# Patient Record
Sex: MALE | Race: WHITE | NOT HISPANIC OR LATINO | URBAN - NONMETROPOLITAN AREA
[De-identification: names, ages, dates, MRNs, and addresses within clinical notes are randomized per-mention and may not be internally consistent; named-entity substitution may affect disease eponyms.]

---

## 2020-09-23 ENCOUNTER — TRANSCRIBE ORDERS (OUTPATIENT)
Dept: LAB | Facility: HOSPITAL | Age: 55
End: 2020-09-23

## 2020-09-23 ENCOUNTER — LAB (OUTPATIENT)
Dept: LAB | Facility: HOSPITAL | Age: 55
End: 2020-09-23

## 2020-09-23 DIAGNOSIS — D89.89 RADIATION CHIMERA (HCC): ICD-10-CM

## 2020-09-23 DIAGNOSIS — M13.0 POLYARTHROPATHY: ICD-10-CM

## 2020-09-23 DIAGNOSIS — R76.8 FALSE POSITIVE SEROLOGICAL TEST FOR SYPHILIS: ICD-10-CM

## 2020-09-23 DIAGNOSIS — R11.0 NAUSEA: ICD-10-CM

## 2020-09-23 DIAGNOSIS — M72.0 CONTRACTURE OF PALMAR FASCIA: ICD-10-CM

## 2020-09-23 DIAGNOSIS — M13.0 POLYARTHROPATHY: Primary | ICD-10-CM

## 2020-09-23 LAB
CK SERPL-CCNC: 59 U/L (ref 20–200)
CRP SERPL-MCNC: 0.53 MG/DL (ref 0–0.5)
ERYTHROCYTE [SEDIMENTATION RATE] IN BLOOD: 30 MM/HR (ref 0–20)
HAV IGM SERPL QL IA: NORMAL
HBV CORE IGM SERPL QL IA: NORMAL
HBV SURFACE AG SERPL QL IA: NORMAL
HCV AB SER DONR QL: NORMAL
HOLD SPECIMEN: NORMAL
URATE SERPL-MCNC: 6.2 MG/DL (ref 3.4–7)

## 2020-09-23 PROCEDURE — 36415 COLL VENOUS BLD VENIPUNCTURE: CPT

## 2020-09-23 PROCEDURE — 86140 C-REACTIVE PROTEIN: CPT

## 2020-09-23 PROCEDURE — 82550 ASSAY OF CK (CPK): CPT

## 2020-09-23 PROCEDURE — 84550 ASSAY OF BLOOD/URIC ACID: CPT

## 2020-09-23 PROCEDURE — 80074 ACUTE HEPATITIS PANEL: CPT

## 2020-09-23 PROCEDURE — 85652 RBC SED RATE AUTOMATED: CPT

## 2020-10-23 ENCOUNTER — OFFICE VISIT (OUTPATIENT)
Dept: PULMONOLOGY | Facility: CLINIC | Age: 55
End: 2020-10-23

## 2020-10-23 VITALS
DIASTOLIC BLOOD PRESSURE: 82 MMHG | WEIGHT: 254 LBS | RESPIRATION RATE: 18 BRPM | TEMPERATURE: 97.5 F | BODY MASS INDEX: 31.58 KG/M2 | OXYGEN SATURATION: 98 % | SYSTOLIC BLOOD PRESSURE: 132 MMHG | HEART RATE: 98 BPM | HEIGHT: 75 IN

## 2020-10-23 DIAGNOSIS — J30.1 SEASONAL ALLERGIC RHINITIS DUE TO POLLEN: ICD-10-CM

## 2020-10-23 DIAGNOSIS — R05.9 COUGH: ICD-10-CM

## 2020-10-23 DIAGNOSIS — Z87.891 PERSONAL HISTORY OF NICOTINE DEPENDENCE: ICD-10-CM

## 2020-10-23 DIAGNOSIS — R91.1 LUNG NODULE SEEN ON IMAGING STUDY: Primary | ICD-10-CM

## 2020-10-23 PROCEDURE — 99204 OFFICE O/P NEW MOD 45 MIN: CPT | Performed by: INTERNAL MEDICINE

## 2020-10-23 RX ORDER — MONTELUKAST SODIUM 10 MG/1
10 TABLET ORAL NIGHTLY
COMMUNITY

## 2020-10-23 RX ORDER — LISINOPRIL 40 MG/1
40 TABLET ORAL DAILY
COMMUNITY

## 2020-10-23 RX ORDER — PANTOPRAZOLE SODIUM 40 MG/1
40 TABLET, DELAYED RELEASE ORAL DAILY
COMMUNITY

## 2020-10-23 RX ORDER — IBUPROFEN 800 MG/1
800 TABLET ORAL EVERY 6 HOURS PRN
COMMUNITY

## 2020-10-23 RX ORDER — CHLORTHALIDONE 25 MG/1
25 TABLET ORAL DAILY
COMMUNITY

## 2020-10-23 RX ORDER — SIMVASTATIN 20 MG
20 TABLET ORAL NIGHTLY
COMMUNITY

## 2020-10-23 NOTE — PROGRESS NOTES
"     New Pulmonary Patient Office Visit      Patient Name: Festus Dinh    Referring Physician: Darby Kimbrough APRN    Chief Complaint:    Chief Complaint   Patient presents with   • Advice Only       History of Present Illness: Festus Dinh is a 55 y.o. male who is here today to establish care with Pulmonary.  Patient was referred secondary to recent CT scan showed \"2 small nodules\".  Patient did not bring scan with him to clinic today nor do I have a report.    He continues to smoke and has 50-pack-year smoking history.  His wife also smokes and they are contemplating quitting.  However, they bother cigarettes and bulk and just went out of state to buy $1000 worth of cigarettes and states he does not want to try to quit until he smokes those.    Patient is retired and has noticed that he has lived a much more sedentary lifestyle since quitting working and has gained a few pounds.  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    He states that he has never been told that he had asthma or COPD.  He denies any significant shortness of breath or cough.  Denies any hemoptysis, night sweats or unexplained weight loss.    He does have seasonal allergies which are well controlled with Singulair which was recently started.  He has known GERD and it is well controlled with Protonix.    Subjective      Review of Systems:   Review of Systems   Constitutional: Negative for appetite change and fever.   HENT: Positive for postnasal drip. Negative for nosebleeds, sore throat and voice change.    Eyes: Negative for discharge and visual disturbance.   Respiratory: Negative for cough, shortness of breath and wheezing.    Cardiovascular: Negative for chest pain, palpitations and leg swelling.   Gastrointestinal: Negative for abdominal pain, blood in stool, constipation, diarrhea and GERD.   Endocrine: Negative for cold intolerance and heat intolerance.   Genitourinary: Negative for difficulty urinating. " "  Musculoskeletal: Positive for arthralgias, back pain and myalgias.   Skin: Negative for rash and bruise.   Allergic/Immunologic: Negative for immunocompromised state.   Neurological: Negative for dizziness and weakness.   Hematological: Negative for adenopathy. Does not bruise/bleed easily.   Psychiatric/Behavioral: Negative for suicidal ideas and depressed mood.       Past Medical History:   Past Medical History:   Diagnosis Date   • Hypertension        Past Surgical History: History reviewed. No pertinent surgical history.    Family History:   Family History   Problem Relation Age of Onset   • Arthritis Mother        Social History:   Social History     Socioeconomic History   • Marital status: Unknown     Spouse name: Not on file   • Number of children: Not on file   • Years of education: Not on file   • Highest education level: Not on file   Tobacco Use   • Smoking status: Current Every Day Smoker     Packs/day: 1.50       Medications:     Current Outpatient Medications:   •  chlorthalidone (HYGROTON) 25 MG tablet, Take 25 mg by mouth Daily., Disp: , Rfl:   •  ibuprofen (ADVIL,MOTRIN) 800 MG tablet, Take 800 mg by mouth Every 6 (Six) Hours As Needed for Mild Pain ., Disp: , Rfl:   •  lisinopril (PRINIVIL,ZESTRIL) 40 MG tablet, Take 40 mg by mouth Daily., Disp: , Rfl:   •  montelukast (SINGULAIR) 10 MG tablet, Take 10 mg by mouth Every Night., Disp: , Rfl:   •  pantoprazole (PROTONIX) 40 MG EC tablet, Take 40 mg by mouth Daily., Disp: , Rfl:   •  simvastatin (ZOCOR) 20 MG tablet, Take 20 mg by mouth Every Night., Disp: , Rfl:     Allergies:   No Known Allergies    Objective     Physical Exam:  Vital Signs:   Vitals:    10/23/20 0944   BP: 132/82   Pulse: 98   Resp: 18   Temp: 97.5 °F (36.4 °C)   SpO2: 98%   Weight: 115 kg (254 lb)   Height: 190.5 cm (75\")       Physical Exam  Constitutional:       General: He is not in acute distress.     Appearance: Normal appearance. He is well-developed. He is not " ill-appearing or toxic-appearing.   HENT:      Head: Normocephalic and atraumatic.      Right Ear: Tympanic membrane normal.      Left Ear: Tympanic membrane normal.      Nose: Rhinorrhea present.      Mouth/Throat:      Mouth: Mucous membranes are moist.      Pharynx: Oropharynx is clear. No oropharyngeal exudate.   Eyes:      General: No scleral icterus.        Right eye: No discharge.         Left eye: No discharge.      Extraocular Movements: Extraocular movements intact.   Neck:      Musculoskeletal: Normal range of motion and neck supple.      Trachea: No tracheal deviation.   Cardiovascular:      Rate and Rhythm: Normal rate and regular rhythm.   Pulmonary:      Effort: No accessory muscle usage or respiratory distress.      Breath sounds: Normal breath sounds. No wheezing.   Abdominal:      General: There is no distension.      Palpations: Abdomen is soft.   Musculoskeletal: Normal range of motion.      Right lower leg: No edema.      Left lower leg: No edema.   Lymphadenopathy:      Cervical: No cervical adenopathy.   Skin:     General: Skin is warm and dry.      Findings: No rash.      Comments: Tattoos noted throughout   Neurological:      General: No focal deficit present.      Mental Status: He is alert and oriented to person, place, and time.      Motor: No weakness.      Gait: Gait normal.   Psychiatric:         Mood and Affect: Mood normal.         Behavior: Behavior normal.         Thought Content: Thought content normal.         Judgment: Judgment normal.         Results Review:   Labs: Reviewed.  No results found for: WBC, HGB, HCT, MCV, PLT  No results found for: GLUCOSE, CALCIUM, NA, K, CO2, CL, BUN, CREATININE, EGFRIFAFRI, EGFRIFNONA, BCR, ANIONGAP      ABG: No results found for: PHART, SOE3MBR, PO2ART, HGBBG, B1SGNYFK, CFIO2, FCOHB, CARBOXYHGB, FMETHB    Radiology Scans:   Request CT scan report      PFT IMPRESSION:    None available for review    Assessment / Plan      Assessment/Plan:    1.  Lung nodule seen on imaging study  We have requested CT scan report for this patient.  Given what he is told me, appears to be subcentimeter lung nodules and he is asymptomatic, but he is high risk given his smoking history.  Based on size will determine need for repeat imaging for surveillance versus PET scan.  Discussed this extensively with the patient and I let him know that our office would contact him once we got CT scan report.  Advised him to call the office if he does not hear from us within 1 week.    After clinic, I was able to obtain CT scan report which showed evidence of old granulomatous lung disease with calcifications.  No lymphadenopathy.  Moderate to severe centrilobular emphysema.  Mild central bronchial thickening with a 5.5 mm nodule in superior segment of right lower lobe.  5 mm nodule in left lower lobe.  Advised for follow-up CT scan in 1 year.    2. Personal history of nicotine dependence  Tobacco abuse cessation counseling for greater than 6 minutes.  Patient is not currently interested in trying as he just bought his cigarettes, but states he and his wife have been contemplating stopping smoking.  He wants her to start coming to see me and they want to try to quit together.    3. Cough  Check PFTs to rule out any underlying lung disease.  Start inhalers if indicated by PFT results.  - Pulmonary Function Test; Future    4. Seasonal allergic rhinitis due to pollen  Encouraged him to continue to use Singulair.  Also advised him that he could try an over-the-counter antihistamine.       Follow Up:   Return in about 3 months (around 1/23/2021).    Marianna Wang MD  Pulmonary/Critical Care Physician   [facility]      Please note that portions of this note may have been completed with a voice recognition program. Efforts were made to edit the dictations, but occasionally words are mistranscribed.

## 2020-10-28 ENCOUNTER — TELEPHONE (OUTPATIENT)
Dept: PULMONOLOGY | Facility: CLINIC | Age: 55
End: 2020-10-28

## 2020-10-28 DIAGNOSIS — R91.1 LUNG NODULE SEEN ON IMAGING STUDY: Primary | ICD-10-CM

## 2020-10-28 NOTE — TELEPHONE ENCOUNTER
Please call let patient know that we were able to get CT scan report from [place].  His nodules were only 5 mm in size and they recommend repeat CT scan in 12 months.  I have gone ahead and attached the order for that CT scan.

## 2021-02-05 ENCOUNTER — OFFICE VISIT (OUTPATIENT)
Dept: PULMONOLOGY | Facility: CLINIC | Age: 56
End: 2021-02-05

## 2021-02-05 VITALS
BODY MASS INDEX: 34.07 KG/M2 | WEIGHT: 274 LBS | OXYGEN SATURATION: 98 % | RESPIRATION RATE: 18 BRPM | DIASTOLIC BLOOD PRESSURE: 84 MMHG | TEMPERATURE: 97.8 F | HEIGHT: 75 IN | SYSTOLIC BLOOD PRESSURE: 118 MMHG | HEART RATE: 98 BPM

## 2021-02-05 DIAGNOSIS — R05.9 COUGH: ICD-10-CM

## 2021-02-05 DIAGNOSIS — R06.02 SOB (SHORTNESS OF BREATH): Primary | ICD-10-CM

## 2021-02-05 DIAGNOSIS — R91.1 LUNG NODULE SEEN ON IMAGING STUDY: Primary | ICD-10-CM

## 2021-02-05 DIAGNOSIS — Z87.891 PERSONAL HISTORY OF NICOTINE DEPENDENCE: ICD-10-CM

## 2021-02-05 DIAGNOSIS — J30.1 SEASONAL ALLERGIC RHINITIS DUE TO POLLEN: ICD-10-CM

## 2021-02-05 PROCEDURE — 99213 OFFICE O/P EST LOW 20 MIN: CPT | Performed by: INTERNAL MEDICINE

## 2021-02-05 PROCEDURE — 94060 EVALUATION OF WHEEZING: CPT | Performed by: INTERNAL MEDICINE

## 2021-02-05 NOTE — PROGRESS NOTES
Pulmonary follow-up office Visit      Chief Complaint:    Chief Complaint   Patient presents with   • Follow-up   • lung problem       Subjective:  After last clinic visit, we were able to get CT scan report which showed 5 mm nodules.  We are unable to get PFTs today due to computer malfunction.  However, office spirometry showed    He continues to smoke, but states he and his wife are finally ready to quit.  She had a health scare recently where there was concern over possible TIA/stroke.  She has been advised to stop smoking.  States he wants to go cold turkey and declines pharmacologic agents.  Advised him that given his significant smoking history and long duration that he may need help with this.    He denies any significant shortness of breath, but does have intermittent moderate cough that is intermittently productive of white to yellow mucus.  This is much worse with allergy seasons and has noticed increased recently since he has been having to use the heater more in his camper.  He feels like the dry air makes it worse, but does notice some improvement with his Flonase and Singulair.  Not currently taking an antihistamine.  Denies any fevers or chills.  Admits to weight gain secondary to sedentary lifestyle.    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      Subjective      Review of Systems:   Review of Systems   Constitutional: Positive for unexpected weight gain. Negative for appetite change and fever.   HENT: Negative for nosebleeds, postnasal drip, sore throat and voice change.    Eyes: Negative for discharge and visual disturbance.   Respiratory: Negative for cough, shortness of breath and wheezing.    Cardiovascular: Negative for chest pain, palpitations and leg swelling.   Gastrointestinal: Negative for abdominal pain, blood in stool, constipation, diarrhea and GERD.   Endocrine: Negative for cold intolerance and heat intolerance.  "  Genitourinary: Negative for difficulty urinating.   Musculoskeletal: Positive for arthralgias and back pain. Negative for myalgias.   Skin: Negative for rash and bruise.   Allergic/Immunologic: Negative for immunocompromised state.   Neurological: Negative for dizziness and weakness.   Hematological: Negative for adenopathy. Does not bruise/bleed easily.   Psychiatric/Behavioral: Negative for suicidal ideas and depressed mood.       Social History:   Social History     Socioeconomic History   • Marital status: Unknown     Spouse name: Not on file   • Number of children: Not on file   • Years of education: Not on file   • Highest education level: Not on file   Tobacco Use   • Smoking status: Current Every Day Smoker     Packs/day: 1.50     Years: 40.00     Pack years: 60.00     Start date: 2/5/1981   • Smokeless tobacco: Never Used       Medications:     Current Outpatient Medications:   •  chlorthalidone (HYGROTON) 25 MG tablet, Take 25 mg by mouth Daily., Disp: , Rfl:   •  ibuprofen (ADVIL,MOTRIN) 800 MG tablet, Take 800 mg by mouth Every 6 (Six) Hours As Needed for Mild Pain ., Disp: , Rfl:   •  lisinopril (PRINIVIL,ZESTRIL) 40 MG tablet, Take 40 mg by mouth Daily., Disp: , Rfl:   •  montelukast (SINGULAIR) 10 MG tablet, Take 10 mg by mouth Every Night., Disp: , Rfl:   •  pantoprazole (PROTONIX) 40 MG EC tablet, Take 40 mg by mouth Daily., Disp: , Rfl:   •  simvastatin (ZOCOR) 20 MG tablet, Take 20 mg by mouth Every Night., Disp: , Rfl:     Allergies:   No Known Allergies    Objective     Physical Exam:  Vital Signs:   Vitals:    02/05/21 0855   BP: 118/84   Pulse: 98   Resp: 18   Temp: 97.8 °F (36.6 °C)   SpO2: 98%   Weight: 124 kg (274 lb)   Height: 190.5 cm (75\")       Physical Exam  Constitutional:       General: He is not in acute distress.     Appearance: Normal appearance. He is well-developed. He is not ill-appearing or toxic-appearing.   HENT:      Head: Normocephalic and atraumatic.      Right Ear: " Tympanic membrane and external ear normal.      Left Ear: Tympanic membrane and external ear normal.      Nose: No rhinorrhea.      Mouth/Throat:      Mouth: Mucous membranes are moist.      Pharynx: Oropharynx is clear. No oropharyngeal exudate.   Eyes:      General: No scleral icterus.        Right eye: No discharge.         Left eye: No discharge.      Extraocular Movements: Extraocular movements intact.   Neck:      Musculoskeletal: Normal range of motion and neck supple.      Trachea: No tracheal deviation.   Cardiovascular:      Rate and Rhythm: Normal rate and regular rhythm.   Pulmonary:      Effort: No accessory muscle usage or respiratory distress.      Breath sounds: Normal breath sounds. No wheezing.   Abdominal:      General: There is no distension.      Palpations: Abdomen is soft.   Musculoskeletal: Normal range of motion.      Right lower leg: No edema.      Left lower leg: No edema.   Lymphadenopathy:      Cervical: No cervical adenopathy.   Skin:     General: Skin is warm and dry.      Findings: No rash.      Comments: Tattoos noted throughout   Neurological:      General: No focal deficit present.      Mental Status: He is alert and oriented to person, place, and time.      Motor: No weakness.      Gait: Gait normal.   Psychiatric:         Mood and Affect: Mood normal.         Behavior: Behavior normal.         Thought Content: Thought content normal.         Judgment: Judgment normal.         Results Review:   Labs: Reviewed.  No results found for: WBC, HGB, HCT, MCV, PLT  No results found for: GLUCOSE, BUN, CREATININE, EGFRIFNONA, EGFRIFAFRI, BCR, K, CO2, CALCIUM, PROTENTOTREF, ALBUMIN, LABIL2, BILIRUBIN, AST, ALT    Radiology Scans: I personally reviewed films.   Sept 2020 Ct chest shows RLL 5.5 mm nodule and LLL 5mm nodule with no lymphadenopathy      PFT IMPRESSION:   Feb 2021 Spirometry showed FEV1 82%, FEV1/FVC ratio 73.  No significant bronchodilator responsiveness.    Assessment / Plan       Assessment/Plan:    1. Lung nodule seen on imaging study  5 mm nodules in right lower lobe and left lower lobe.  Will be due for yearly follow-up in September 2021    2. Personal history of nicotine dependence  Tobacco abuse cessation counseling for greater than 8 minutes.  Patient is currently interested in trying to quit.  He states he and his wife have made a pact to quit smoking together.  Wants to go cold turkey.  They used to go buy their cigarettes in [place] and they do not plan to go again.  Plan to stop when the current supply runs out.  Discussed different options available for smoking cessation and advised him that I will be happy to prescribe these if needed.  States he will call me.    3. Cough  Most likely related to allergies.  Spirometry does not suggest obstructive lung disease.  No indication for inhalers at this time.    4. Seasonal allergic rhinitis due to pollen  Encouraged him to continue to use Singulair.  Advised to add Zyrtec to regimen.    Follow Up:   Return in about 8 months (around 10/11/2021).    Marianna Wang MD  Pulmonary/Critical Care Physician   [facility]      Please note that portions of this note may have been completed with a voice recognition program. Efforts were made to edit the dictations, but occasionally words are mistranscribed.

## 2021-09-15 DIAGNOSIS — R91.1 LUNG NODULE SEEN ON IMAGING STUDY: ICD-10-CM

## 2021-10-08 ENCOUNTER — OFFICE VISIT (OUTPATIENT)
Dept: PULMONOLOGY | Facility: CLINIC | Age: 56
End: 2021-10-08

## 2021-10-08 VITALS
HEIGHT: 75 IN | DIASTOLIC BLOOD PRESSURE: 80 MMHG | OXYGEN SATURATION: 97 % | HEART RATE: 79 BPM | SYSTOLIC BLOOD PRESSURE: 116 MMHG | BODY MASS INDEX: 32.7 KG/M2 | WEIGHT: 263 LBS | RESPIRATION RATE: 18 BRPM

## 2021-10-08 DIAGNOSIS — Z87.891 PERSONAL HISTORY OF NICOTINE DEPENDENCE: ICD-10-CM

## 2021-10-08 DIAGNOSIS — R91.1 LUNG NODULE SEEN ON IMAGING STUDY: Primary | ICD-10-CM

## 2021-10-08 DIAGNOSIS — R06.02 SOB (SHORTNESS OF BREATH): ICD-10-CM

## 2021-10-08 PROCEDURE — 99213 OFFICE O/P EST LOW 20 MIN: CPT | Performed by: INTERNAL MEDICINE

## 2021-10-08 RX ORDER — PREDNISONE 10 MG/1
TABLET ORAL
COMMUNITY
Start: 2021-09-15

## 2021-10-08 RX ORDER — FOLIC ACID 1 MG/1
1000 TABLET ORAL DAILY
COMMUNITY
Start: 2021-09-23

## 2021-10-08 RX ORDER — FLUTICASONE PROPIONATE 50 MCG
SPRAY, SUSPENSION (ML) NASAL
COMMUNITY
Start: 2021-09-07

## 2021-10-08 NOTE — PROGRESS NOTES
Pulmonary follow-up office Visit      Chief Complaint:    Chief Complaint   Patient presents with   • Follow-up   • Shortness of Breath       Subjective:  Since last visit, he had repeat CT scan last month which showed no new change in nodules.  He feels like his shortness of breath has significantly improved as he and his wife have significantly cut down on their smoking.  He is now smoking less than 10 cigarettes a day and plans to set a quit date soon.  He admits that he will use albuterol occasionally, but only with yard work and he relates it more to seasonal allergies causing wheezing.      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      Subjective      Review of Systems:   Review of Systems   Constitutional: Negative for appetite change, fever and unexpected weight gain.   HENT: Negative for nosebleeds, postnasal drip, sore throat and voice change.    Eyes: Negative for discharge and visual disturbance.   Respiratory: Negative for cough, shortness of breath and wheezing.    Cardiovascular: Negative for chest pain, palpitations and leg swelling.   Gastrointestinal: Negative for abdominal pain, blood in stool, constipation, diarrhea and GERD.   Endocrine: Negative for cold intolerance and heat intolerance.   Genitourinary: Negative for difficulty urinating.   Musculoskeletal: Positive for arthralgias. Negative for back pain and myalgias.   Skin: Negative for rash and bruise.   Allergic/Immunologic: Positive for environmental allergies. Negative for food allergies and immunocompromised state.   Neurological: Negative for dizziness and weakness.   Hematological: Negative for adenopathy. Does not bruise/bleed easily.   Psychiatric/Behavioral: Negative for suicidal ideas and depressed mood.       Social History:   Social History     Socioeconomic History   • Marital status: Unknown     Spouse name: Not on file   • Number of children: Not on file   •  "Years of education: Not on file   • Highest education level: Not on file   Tobacco Use   • Smoking status: Current Every Day Smoker     Packs/day: 1.00     Years: 40.00     Pack years: 40.00     Start date: 2/5/1981   • Smokeless tobacco: Never Used   Substance and Sexual Activity   • Alcohol use: Never   • Drug use: Never   • Sexual activity: Defer       Medications:     Current Outpatient Medications:   •  chlorthalidone (HYGROTON) 25 MG tablet, Take 25 mg by mouth Daily., Disp: , Rfl:   •  fluticasone (FLONASE) 50 MCG/ACT nasal spray, SPRAY 1   2 SPRAY BY INTRANASAL ROUTE EVERY DAY IN EACH NOSTRIL AS NEEDED, Disp: , Rfl:   •  folic acid (FOLVITE) 1 MG tablet, Take 1,000 mcg by mouth Daily., Disp: , Rfl:   •  ibuprofen (ADVIL,MOTRIN) 800 MG tablet, Take 800 mg by mouth Every 6 (Six) Hours As Needed for Mild Pain ., Disp: , Rfl:   •  lisinopril (PRINIVIL,ZESTRIL) 40 MG tablet, Take 40 mg by mouth Daily., Disp: , Rfl:   •  methotrexate 2.5 MG tablet, TAKE 6 TABLETS BY MOUTH WEEKLY, Disp: , Rfl:   •  montelukast (SINGULAIR) 10 MG tablet, Take 10 mg by mouth Every Night., Disp: , Rfl:   •  pantoprazole (PROTONIX) 40 MG EC tablet, Take 40 mg by mouth Daily., Disp: , Rfl:   •  predniSONE (DELTASONE) 10 MG tablet, TAKE 1 TABLET BY MOUTH DAILY AS NEEDED FOR 2 5 DAYS FOR FLARE UPS MAY REPEAT ONCE A WEEK, Disp: , Rfl:   •  simvastatin (ZOCOR) 20 MG tablet, Take 20 mg by mouth Every Night., Disp: , Rfl:     Allergies:   No Known Allergies    Objective     Physical Exam:  Vital Signs:   Vitals:    10/08/21 0956   BP: 116/80   Pulse: 79   Resp: 18   SpO2: 97%   Weight: 119 kg (263 lb)   Height: 190.5 cm (75\")       Physical Exam  Constitutional:       General: He is not in acute distress.     Appearance: Normal appearance. He is well-developed. He is not ill-appearing or toxic-appearing.   HENT:      Head: Normocephalic and atraumatic.      Right Ear: External ear normal.      Left Ear: External ear normal.      Nose: No " congestion or rhinorrhea.      Mouth/Throat:      Mouth: Mucous membranes are moist.      Pharynx: Oropharynx is clear. No oropharyngeal exudate or posterior oropharyngeal erythema.   Eyes:      General: No scleral icterus.        Right eye: No discharge.         Left eye: No discharge.      Extraocular Movements: Extraocular movements intact.      Conjunctiva/sclera: Conjunctivae normal.   Neck:      Trachea: No tracheal deviation.   Cardiovascular:      Rate and Rhythm: Normal rate and regular rhythm.   Pulmonary:      Effort: No accessory muscle usage or respiratory distress.      Breath sounds: Normal breath sounds. No wheezing.   Abdominal:      General: There is no distension.      Palpations: Abdomen is soft.   Musculoskeletal:         General: Normal range of motion.      Cervical back: Normal range of motion and neck supple.      Right lower leg: No edema.      Left lower leg: No edema.   Lymphadenopathy:      Cervical: No cervical adenopathy.   Skin:     General: Skin is warm and dry.      Findings: No rash.      Comments: Tattoos noted throughout   Neurological:      General: No focal deficit present.      Mental Status: He is alert and oriented to person, place, and time. Mental status is at baseline.      Motor: No weakness.      Gait: Gait normal.   Psychiatric:         Mood and Affect: Mood normal.         Behavior: Behavior normal.         Thought Content: Thought content normal.         Judgment: Judgment normal.         Results Review:   Labs: Reviewed.  No results found for: WBC, HGB, HCT, MCV, PLT  No results found for: GLUCOSE, BUN, CREATININE, EGFRIFNONA, EGFRIFAFRI, BCR, K, CO2, CALCIUM, PROTENTOTREF, ALBUMIN, LABIL2, BILIRUBIN, AST, ALT    Radiology Scans: I personally reviewed films.   Sept 2020 Ct chest shows RLL 5.5 mm nodule and LLL 5mm nodule with no lymphadenopathy  September 2021 CT scan of the chest showed no change in previously noted nodules.  No lymphadenopathy.      PFT  IMPRESSION:   Feb 2021 Spirometry showed FEV1 82%, FEV1/FVC ratio 73.  No significant bronchodilator responsiveness.    Assessment / Plan      Assessment/Plan:    1. Lung nodule seen on imaging study  Needs repeat CT scan in September 2022 which will complete 2 years worth surveillance.  No further work-up needed if unchanged on neck CT scan.  At that point, he would need to transition to yearly low-dose lung cancer screening CT scans    Follow-up in 1 year with Sarahi malave    2. SOB (shortness of breath)  Resolved    3. Personal history of nicotine dependence  Tobacco abuse cessation counseling for greater than 3 min.  Encouraged to set quit date.  Congratulated him on his ability to cut down and now planning to set quit date.        Follow Up:   Return in about 1 year (around 10/8/2022) for with Sarahi.    Marianna Wang MD  Pulmonary/Critical Care Physician   [facility]      Please note that portions of this note may have been completed with a voice recognition program. Efforts were made to edit the dictations, but occasionally words are mistranscribed.

## 2022-10-07 ENCOUNTER — OFFICE VISIT (OUTPATIENT)
Dept: PULMONOLOGY | Facility: CLINIC | Age: 57
End: 2022-10-07

## 2022-10-07 VITALS
WEIGHT: 250 LBS | BODY MASS INDEX: 31.08 KG/M2 | RESPIRATION RATE: 14 BRPM | TEMPERATURE: 96.8 F | HEIGHT: 75 IN | OXYGEN SATURATION: 94 % | DIASTOLIC BLOOD PRESSURE: 80 MMHG | HEART RATE: 73 BPM | SYSTOLIC BLOOD PRESSURE: 120 MMHG

## 2022-10-07 DIAGNOSIS — F17.210 CIGARETTE SMOKER: ICD-10-CM

## 2022-10-07 DIAGNOSIS — R05.9 COUGH, UNSPECIFIED TYPE: ICD-10-CM

## 2022-10-07 DIAGNOSIS — R91.1 LUNG NODULE SEEN ON IMAGING STUDY: Primary | ICD-10-CM

## 2022-10-07 PROCEDURE — 99213 OFFICE O/P EST LOW 20 MIN: CPT | Performed by: NURSE PRACTITIONER

## 2022-10-07 NOTE — PROGRESS NOTES
Follow Up Office Visit      Patient Name: Festus Dinh    Chief Complaint:    Chief Complaint   Patient presents with   • Lung Nodule     followup       History of Present Illness: Festus Dinh is a 57 y.o. male who is here today for follow up of a follow up of lung nodules. Since last visit, the patient reports having his repeat chest CT scan approximately 1 month ago at [facility], though, the report is not currently available for review. The patient reports his primary care physician, Florence Castellanos MD was able to review the chest CT scan and informed him that findings were stable.    The patient denies shortness of breath, wheezing, fevers, hemoptysis, or unintentional weight loss. Denies any recent exacerbations. Does not use supplemental O2. The patient denies completing a full PFTs. His CT scan from 2021 revealed his lungs were hyperinflated. He reports he smoked 2 packs daily in the past, but currently smokes approximately 0.5 pack daily. He notes he and his wife are attempting to wean themselves away by not allowing themselves to smoke in the home or car.     Additionally, he acknowledges he has rheumatoid arthritis and adds he does follow rheumatology. The patient reports he takes methotrexate daily and prednisone as needed.      Subjective      Review of Systems:  Review of Systems   Constitutional: Negative for fever and unexpected weight change.   Respiratory: Positive for cough. Negative for shortness of breath and wheezing.    Cardiovascular: Negative for chest pain and leg swelling.   Musculoskeletal: Positive for arthralgias.        Past Medical History:   Past Medical History:   Diagnosis Date   • Hypertension        Past Surgical History: History reviewed. No pertinent surgical history.    Family History:   Family History   Problem Relation Age of Onset   • Arthritis Mother        Social History:   Social History     Socioeconomic History   • Marital status: Unknown   Tobacco Use   •  "Smoking status: Every Day     Packs/day: 1.00     Years: 40.00     Pack years: 40.00     Types: Cigarettes     Start date: 2/5/1981   • Smokeless tobacco: Never   Substance and Sexual Activity   • Alcohol use: Never   • Drug use: Never   • Sexual activity: Defer       Current Medications:     Current Outpatient Medications:   •  chlorthalidone (HYGROTON) 25 MG tablet, Take 25 mg by mouth Daily., Disp: , Rfl:   •  fluticasone (FLONASE) 50 MCG/ACT nasal spray, SPRAY 1   2 SPRAY BY INTRANASAL ROUTE EVERY DAY IN EACH NOSTRIL AS NEEDED, Disp: , Rfl:   •  folic acid (FOLVITE) 1 MG tablet, Take 1,000 mcg by mouth Daily., Disp: , Rfl:   •  ibuprofen (ADVIL,MOTRIN) 800 MG tablet, Take 800 mg by mouth Every 6 (Six) Hours As Needed for Mild Pain ., Disp: , Rfl:   •  lisinopril (PRINIVIL,ZESTRIL) 40 MG tablet, Take 40 mg by mouth Daily., Disp: , Rfl:   •  methotrexate 2.5 MG tablet, TAKE 6 TABLETS BY MOUTH WEEKLY, Disp: , Rfl:   •  montelukast (SINGULAIR) 10 MG tablet, Take 10 mg by mouth Every Night., Disp: , Rfl:   •  pantoprazole (PROTONIX) 40 MG EC tablet, Take 40 mg by mouth Daily., Disp: , Rfl:   •  predniSONE (DELTASONE) 10 MG tablet, , Disp: , Rfl:   •  simvastatin (ZOCOR) 20 MG tablet, Take 20 mg by mouth Every Night., Disp: , Rfl:      Allergies:   No Known Allergies    Objective     Physical Exam:  Vital Signs:   Vitals:    10/07/22 0917   BP: 120/80   Pulse: 73   Resp: 14   Temp: 96.8 °F (36 °C)   SpO2: 94%   Weight: 113 kg (250 lb)   Height: 190.5 cm (75\")     Body mass index is 31.25 kg/m².    Physical Exam  Constitutional:       General: He is not in acute distress.     Appearance: He is not toxic-appearing.   HENT:      Head: Normocephalic and atraumatic.   Eyes:      Extraocular Movements: Extraocular movements intact.   Cardiovascular:      Rate and Rhythm: Normal rate and regular rhythm.      Heart sounds: Normal heart sounds.   Pulmonary:      Effort: Pulmonary effort is normal.      Breath sounds: Normal " "breath sounds.   Abdominal:      General: There is no distension.   Musculoskeletal:         General: No swelling.      Cervical back: Neck supple.      Right ankle: No swelling.      Left ankle: No swelling.   Skin:     General: Skin is warm and dry.      Findings: No rash.   Neurological:      General: No focal deficit present.      Mental Status: He is alert and oriented to person, place, and time.   Psychiatric:         Mood and Affect: Mood normal.         Behavior: Behavior normal.         Results Review:   September 2021 chest CT scan compared to September 2020 study showed bilateral 5 mm nodules are stable. Lungs are hyperinflated.    Assessment / Plan      Assessment/Plan:   Diagnoses and all orders for this visit:    1. Lung nodule seen on imaging study (Primary)  Will request recent chest CT from [facility]. Patient reports that he has followed up with his PCP about the chest CT scan results and was told that findings were stable. Last scan showed stability compared to 2020. Once stability has been noted for 2 years, patient can resume annual lung cancer screening scans.    2. Cigarette smoker  Complete cessation advised. Ongoing annual lung cancer screening scans recommended, which patient plant to pursue with their PCP.     3. Cough, unspecified type  Secondary to \"sinus issues\" per patient report. He is at risk for the development of COPD. No obstruction noted on prior spirometry though airtrapping was noted on prior chest imaging. Should he develop dyspnea or other concerning respiratory symptoms/chronic cough, would recommend pursuing full PFTs.    Follow Up:   Return if symptoms worsen or fail to improve.  The patient was counseled on diagnostic results, risks and benefits of treatment options, risk factor modifications and the importance of treatment compliance. The patient was advised to contact the clinic with concerns or worsening symptoms.     MAI Almanza   Pulmonary Medicine " [place]     Transcribed from ambient dictation for MAI Almanza by Teresa Stack.  10/07/22   11:01 EDT    Patient consented to recording.